# Patient Record
Sex: FEMALE
[De-identification: names, ages, dates, MRNs, and addresses within clinical notes are randomized per-mention and may not be internally consistent; named-entity substitution may affect disease eponyms.]

---

## 2021-12-08 ENCOUNTER — HOSPITAL ENCOUNTER (INPATIENT)
Dept: HOSPITAL 93 - O/R | Age: 56
LOS: 9 days | Discharge: HOME | DRG: 330 | End: 2021-12-17
Attending: SURGERY | Admitting: SURGERY
Payer: COMMERCIAL

## 2021-12-08 DIAGNOSIS — K76.0: ICD-10-CM

## 2021-12-08 DIAGNOSIS — Z43.3: ICD-10-CM

## 2021-12-08 DIAGNOSIS — K43.2: ICD-10-CM

## 2021-12-08 DIAGNOSIS — K57.32: Primary | ICD-10-CM

## 2021-12-08 DIAGNOSIS — I11.9: ICD-10-CM

## 2021-12-08 DIAGNOSIS — K92.1: ICD-10-CM

## 2021-12-10 ENCOUNTER — HOSPITAL ENCOUNTER (OUTPATIENT)
Dept: HOSPITAL 93 - RX STUDY | Age: 56
Discharge: HOME | End: 2021-12-10
Attending: SURGERY
Payer: COMMERCIAL

## 2021-12-10 DIAGNOSIS — K57.20: Primary | ICD-10-CM

## 2021-12-14 PROCEDURE — 0WQF4ZZ REPAIR ABDOMINAL WALL, PERCUTANEOUS ENDOSCOPIC APPROACH: ICD-10-PCS | Performed by: SURGERY

## 2021-12-14 PROCEDURE — 0DBE4ZZ EXCISION OF LARGE INTESTINE, PERCUTANEOUS ENDOSCOPIC APPROACH: ICD-10-PCS | Performed by: SURGERY

## 2021-12-14 PROCEDURE — 0DBP4ZZ EXCISION OF RECTUM, PERCUTANEOUS ENDOSCOPIC APPROACH: ICD-10-PCS | Performed by: SURGERY

## 2021-12-14 PROCEDURE — 0DTN4ZZ RESECTION OF SIGMOID COLON, PERCUTANEOUS ENDOSCOPIC APPROACH: ICD-10-PCS | Performed by: SURGERY

## 2022-01-04 ENCOUNTER — HOSPITAL ENCOUNTER (EMERGENCY)
Dept: HOSPITAL 93 - ER | Age: 57
Discharge: HOME | End: 2022-01-04
Payer: COMMERCIAL

## 2022-01-04 VITALS — HEIGHT: 60 IN | WEIGHT: 112 LBS | BODY MASS INDEX: 21.99 KG/M2

## 2022-01-04 DIAGNOSIS — I10: ICD-10-CM

## 2022-01-04 DIAGNOSIS — R10.84: Primary | ICD-10-CM

## 2022-01-04 DIAGNOSIS — R31.9: ICD-10-CM

## 2022-01-04 DIAGNOSIS — Z20.822: ICD-10-CM

## 2022-05-04 ENCOUNTER — HOSPITAL ENCOUNTER (OUTPATIENT)
Dept: HOSPITAL 93 - AMB-ENDOS | Age: 57
Discharge: HOME | End: 2022-05-04
Attending: SURGERY
Payer: COMMERCIAL

## 2022-05-04 DIAGNOSIS — K62.4: ICD-10-CM

## 2022-05-04 DIAGNOSIS — Z20.822: ICD-10-CM

## 2022-05-04 DIAGNOSIS — Z88.8: ICD-10-CM

## 2022-05-04 DIAGNOSIS — K56.699: Primary | ICD-10-CM

## 2022-05-04 DIAGNOSIS — R31.9: ICD-10-CM

## 2022-05-04 DIAGNOSIS — K43.2: ICD-10-CM

## 2022-05-04 DIAGNOSIS — E03.9: ICD-10-CM

## 2022-05-04 DIAGNOSIS — K92.1: ICD-10-CM

## 2022-05-04 DIAGNOSIS — K57.30: ICD-10-CM

## 2022-05-04 DIAGNOSIS — K76.0: ICD-10-CM

## 2022-05-04 DIAGNOSIS — E78.00: ICD-10-CM

## 2022-12-18 ENCOUNTER — HOSPITAL ENCOUNTER (INPATIENT)
Dept: HOSPITAL 93 - ER | Age: 57
LOS: 3 days | Discharge: HOME | DRG: 389 | End: 2022-12-21
Attending: SURGERY | Admitting: SURGERY
Payer: COMMERCIAL

## 2022-12-18 VITALS — HEIGHT: 55 IN | WEIGHT: 113 LBS | BODY MASS INDEX: 26.15 KG/M2

## 2022-12-18 DIAGNOSIS — K57.32: ICD-10-CM

## 2022-12-18 DIAGNOSIS — E03.9: ICD-10-CM

## 2022-12-18 DIAGNOSIS — I11.9: ICD-10-CM

## 2022-12-18 DIAGNOSIS — K56.699: Primary | ICD-10-CM

## 2022-12-18 DIAGNOSIS — Z93.3: ICD-10-CM

## 2022-12-18 DIAGNOSIS — K62.4: ICD-10-CM

## 2022-12-18 DIAGNOSIS — K64.0: ICD-10-CM

## 2022-12-18 DIAGNOSIS — K43.2: ICD-10-CM

## 2022-12-18 NOTE — NUR
SE RECIBE PACIENTE ALERTA Y ORIENTADA X3 QUIEN REFIERE QUE LLEVA VARIOS GIBSON
CON DOLOR PELVICO HERNAN QUE EL MISMO AUMENTO EN ESTOS ULTIMOS DOS GIBSON. PACIENTE
REFIERE QUE EN [place] SE LE REALIZO ARACELI DILATACION DEL INTESTINO PORQUE ESTABA
CERRADO. PACIENTE DEL DR TOUS. SE MONITOREAN S/V Y SE UBICA PACIENTE.

## 2022-12-18 NOTE — NUR
FEMINA ALERTA Y ORIENTADA X3 EVALUADA POR DR PAMELA BRIGGSIEN [place] MED[place]. SE
EDUCA Y REFIERE ENTENDER. SE COLECTAN MUESTRAS DE MARY BAJO MEDIDAS
ASEPTICAS. PEND PREPARAR A PTE PARA EXAMEN RECTAL.

## 2022-12-18 NOTE — NUR
SE RECIBE PTE ALERTA Y ORIENTADA EN JOSEFA BAJA CON BARANDAS ELEVADAS POR
SEGURIDAD. SE OBSERVA CON BUEN PATRON RESPIRATORIO. PEND FLEET ENEMA. SE
MANTIENE BAJO OBSERVACION POR CAMBIOS SIGNIFICATIVOS

## 2022-12-19 NOTE — NUR
SE RECIEB PTE FEMENINA DE 57 ANOS ALERTA Y ORIENTADA X3  QUIEN AL MOMENTO NO
REFIERE DOLOR. PTE SE OBSERBA EN DESCANSO ABSOLUTO EN CAMA. PTE PEND A CONSUTA
CON DR MAGDALENO POR RECTAL STENOSIS.

## 2022-12-20 PROCEDURE — 0DJD8ZZ INSPECTION OF LOWER INTESTINAL TRACT, VIA NATURAL OR ARTIFICIAL OPENING ENDOSCOPIC: ICD-10-PCS | Performed by: SURGERY

## 2022-12-20 PROCEDURE — 02HV33Z INSERTION OF INFUSION DEVICE INTO SUPERIOR VENA CAVA, PERCUTANEOUS APPROACH: ICD-10-PCS | Performed by: SURGERY

## 2025-06-18 ENCOUNTER — HOSPITAL ENCOUNTER (OUTPATIENT)
Dept: HOSPITAL 93 - AMB-ENDOS | Age: 60
Discharge: HOME | End: 2025-06-18
Attending: SURGERY
Payer: COMMERCIAL

## 2025-06-18 DIAGNOSIS — K92.1: ICD-10-CM

## 2025-06-18 DIAGNOSIS — Z88.6: ICD-10-CM

## 2025-06-18 DIAGNOSIS — K62.4: ICD-10-CM

## 2025-06-18 DIAGNOSIS — K57.32: ICD-10-CM

## 2025-06-18 DIAGNOSIS — Z86.0100: ICD-10-CM

## 2025-06-18 DIAGNOSIS — K57.20: Primary | ICD-10-CM
